# Patient Record
(demographics unavailable — no encounter records)

---

## 2025-02-19 NOTE — DISCUSSION/SUMMARY
[FreeTextEntry1] : The visit was provided via telehealth using real-time 2-way audio visual technology. The patient, Mariah Regan, was located at home, [place], [place], at the time of the visit. The Genetic Counselor, Judith Quintanilla, was located in [place], [place]. The patient and the Genetic Counselor both participated in the telehealth encounter. Consent for telehealth services was given on 2025 by the patient, Mariah Regan.  REASON FOR CONSULT Mariah Regan is a 42-year-old female who was self-referred for cancer genetic counseling and risk assessment due to a family history of an MAGDA mutation.  RELEVANT MEDICAL HISTORY Ms. Regan is a healthy individual who has never had cancer. She has a family history of an MAGDA mutation, and she has a family history of cancer, see below.  Ms. Regan reportedly underwent genetic testing for the BRCA1 and BRCA2 genes in  and her results were reportedly negative. She stated that she will send a copy of her report to [facility] for review. Ms. Regan also underwent carrier screening at [facility] for 283 genes including the MAGDA gene in . 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 Report was available for review at today's appointment.   OTHER MEDICAL AND SURGICAL HISTORY:  -	 Medical History: No major medical history reported.  -	Surgical History: cholecystectomy (), jaw surgery (10/2013)   PAST OB/GYN HISTORY:  Obstetrical History:   Age at Menarche: 11  Premenopausal Age at First Live Birth: N/A Oral Contraceptive Use: No Hormone Replacement Therapy: No Patient reports that she underwent IUI in the past.    CANCER SCREENING HISTORY:    Breast:  -	Mammography: last 2024, reportedly normal  -	Sonography: last 2024, reportedly normal. -	MRI: No -	Biopsies: No -	Patient reports that she was told that she has dense breast tissue and calcifications were identified on her breast imaging, but no additional follow-up was recommended.  GYN: -	Pelvic Examination: last 2025, reportedly normal  -	Sonography: Patient reports possibly undergoing a TVUS during a work-up with her fertility care team. -	CA-125: No Colon: -	Colonoscopy: last 2018, reportedly normal. Patient reports undergoing colonoscopy due to rectal bleeding. She reports that she was not told to return for a follow-up colonoscopy at an earlier age than the general population. This was the patient's first colonoscopy.  -	Upper Endoscopy: No Skin:   -	FBSE: last 2024, reportedly normal -	Lesions biopsied/removed: Yes, patient reports having a skin lesion excised from her thigh. She states that initial pathology was inconclusive, and she decided to have it fully excised after.   SOCIAL HISTORY: -	Tobacco-product use: No  FAMILY HISTORY: Maternal ancestry was reported as [place] and paternal ancestry was reported as [place], Micronesian, and Ashkenazi Pentec[employer]alism. A detailed family history of cancer was ascertained. Relevant diagnoses are detailed below and in the scanned pedigree.   Of note, Ms. Regan's father underwent genetic testing in 2024 at [email or web address] ordered by Dr. Silver Rojas. His genetic test included analysis of the following genes: APC, MAGDA, BARD1, BRCA1, BRCA2, BRIP1, CDH1, CDKN2A, CHEK2, MEN1, MLH1, MSH2, MSH3, MSH6, NF1, PALB2, PMS2, PTEN, RAD51C, RAD51D, STK11, TP53, TSC1, TSC2, and VHL. 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 Report was available for review during today's appointment as Ms. Regan's father provided written consent to [facility] allowing his genetic testing results to be shared with his daughter for the purposes of today's appointment.  	 	RISK ASSESSMENT: Ms. Regan's family history is positive for an MAGDA mutation given her father's genetic testing results. We discussed that given the fact that her father underwent comprehensive genetic testing for genes associated with breast cancer, gynecological cancers, and pancreatic cancer, and given her mother's family history of cancer is not suspicious for an inherited predisposition for cancer, we recommend that she undergoes genetic testing via single-site analysis of the familial MAGDA mutation identified on her father's genetic test.    We discussed the risks, benefits and limitations, and implications of genetic testing. We also discussed the psychosocial implications of genetic testing. Possible test results were reviewed with Ms. Regan, along with associated medical management options. The Genetic Information Non-discrimination Act (ELIE) was also reviewed.   Ms. Regan verbally consented to the above mentioned genetic testing panel. Informed consent form was emailed to the patient, and a saliva sample kit will be mailed to the patient. Once the sample has been collected and sent out, Ms. Regan will inform us.   PLAN:  1.	Saliva kit was sent to Ms. Regan's home for collection. Once collected and dropped off, patient will inform us.  2.	Ms. Regan was sent a copy of the informed consent via email to be filled, signed, and returned to us. 3.	Family member's report(s) will be scanned to [facility] secure file cabinet. 4.	We will contact Ms. Regan once the results are available and will schedule a follow-up appointment, as needed. Results generally return in 2-3 weeks from the day the sample is received in the lab.  For any additional questions please call [facility] at [phone].    Judith Quintanilla MS, [facility], [facility]   CC:  Patient

## 2025-03-20 NOTE — DISCUSSION/SUMMARY
[FreeTextEntry1] : RESULTS TRANSMISSION Mariah Regan is a 42-year-old female who was called on 03/20/2025 for a discussion regarding her genetic testing results related to hereditary cancer predisposition.  Ms. Regan was originally seen at [facility] on 02/19/2025 for hereditary cancer predisposition risk assessment due to a family history of an MAGDA mutation. Ms. Regan decided to pursue genetic testing using the single-site analysis of the familial MAGDA mutation offered by [facility].  TEST RESULTS: NEGATIVE  The familial MAGDA mutation (c.3576G>A, p.[id number]) was NOT detected on Ms. Regan's genetic test.  RESULTS INTERPRETATION AND ASSESSMENT: Given Ms. Regan's personal and current reported family history of cancer, and her negative genetic test results, the following screening guidelines and risk-reducing recommendations were discussed:  OTHER:  - In the absence of other indications, Ms. Regan should practice age-appropriate cancer screening of other organ systems as recommended for the general population.  We also discussed that, while no cause of the patient's personal and family history of cancer was identified, this result, while reassuring, does entirely not rule out a hereditary cancer risk in the patient. It is possible, although unlikely, the patient has a mutation in one of the genes tested that is not detectable by this analysis, or has a mutation in a different gene, either known or unknown. It is also possible there is a hereditary cancer predisposition in the family, but the patient did not inherit it.  We informed Ms. Regan that our knowledge of genetics and inherited cancer conditions is changing rapidly. Therefore, we recommended that Ms. Regan contact our office, every 2 to 3 years, to discuss relevant advances in cancer genetics.  We emphasized the importance of re-contacting us with updates regarding her personal and family history of cancer as well as any updates regarding additional cancer genetic test results performed for the patient and/or family members.  Such updates could possibly change our risk assessment and recommendations.   In addition, we discussed Ms. Regan's paternal half-sister and paternal uncle could consider pursuing cancer risk assessment genetic counseling with the option of genetic testing.   PLAN: 1.	See above for recommended screening and risk-reduction strategies. 2.	Patient informed consult note(s) will be available through their [facility] patient portal and genetic test results will be released via [facility]'s laboratory portal.  3.	Ms. Regan was encouraged to contact us every 2-3 years to discuss relevant advances in cancer genetics, or sooner if there are any changes in her personal or family history of cancer.   For any additional questions please call [facility] at [phone].    Judith Quintanilla MS, [facility], [facility]   CC:  Patient